# Patient Record
Sex: MALE | Race: WHITE | NOT HISPANIC OR LATINO | Employment: FULL TIME | URBAN - METROPOLITAN AREA
[De-identification: names, ages, dates, MRNs, and addresses within clinical notes are randomized per-mention and may not be internally consistent; named-entity substitution may affect disease eponyms.]

---

## 2024-06-04 DIAGNOSIS — Z12.11 COLON CANCER SCREENING: ICD-10-CM

## 2024-06-04 RX ORDER — POLYETHYLENE GLYCOL 3350, SODIUM CHLORIDE, SODIUM BICARBONATE, POTASSIUM CHLORIDE 420; 11.2; 5.72; 1.48 G/4L; G/4L; G/4L; G/4L
POWDER, FOR SOLUTION ORAL
Qty: 4000 ML | Refills: 0 | Status: SHIPPED | OUTPATIENT
Start: 2024-06-04

## 2024-07-26 ENCOUNTER — APPOINTMENT (OUTPATIENT)
Dept: GASTROENTEROLOGY | Facility: EXTERNAL LOCATION | Age: 59
End: 2024-07-26
Payer: COMMERCIAL

## 2024-07-26 DIAGNOSIS — Z12.11 COLON CANCER SCREENING: Primary | ICD-10-CM

## 2024-07-26 DIAGNOSIS — Z86.010 PERSONAL HISTORY OF COLONIC POLYPS: ICD-10-CM

## 2024-07-26 PROCEDURE — 45378 DIAGNOSTIC COLONOSCOPY: CPT | Performed by: INTERNAL MEDICINE

## 2024-07-26 NOTE — PROGRESS NOTES
Endoscopic procedure was done in [facility] and the procedure report can be found under the media tab.     no

## 2024-12-09 ENCOUNTER — APPOINTMENT (OUTPATIENT)
Dept: OTOLARYNGOLOGY | Facility: CLINIC | Age: 59
End: 2024-12-09
Payer: COMMERCIAL

## 2024-12-09 VITALS — SYSTOLIC BLOOD PRESSURE: 121 MMHG | HEART RATE: 71 BPM | DIASTOLIC BLOOD PRESSURE: 77 MMHG

## 2024-12-09 DIAGNOSIS — H61.23 BILATERAL IMPACTED CERUMEN: Primary | ICD-10-CM

## 2024-12-09 PROCEDURE — 69210 REMOVE IMPACTED EAR WAX UNI: CPT | Performed by: PHYSICIAN ASSISTANT

## 2024-12-09 PROCEDURE — 99203 OFFICE O/P NEW LOW 30 MIN: CPT | Performed by: PHYSICIAN ASSISTANT

## 2024-12-09 PROCEDURE — 1036F TOBACCO NON-USER: CPT | Performed by: PHYSICIAN ASSISTANT

## 2024-12-09 NOTE — PROGRESS NOTES
Angel Luis Antonio is a 59 y.o. year old male patient with Cerumen Impaction (Hearing effected due to wax build up in left ear )     Patient presents to the office today for assessment of his ears.  Patient has a known history of cerumen impaction.  He is here today complaining that the left ear feels full clogged and blocked.  He states that it has been several years since his last visit for an ear cleaning.  He is otherwise well without other ENT related concerns.      Review of Systems   All other systems reviewed and are negative.        Physical Exam:   General appearance: No acute distress. Normal facies. Symmetric facial movement. No gross lesions of the face are noted.  The external ear structures appear normal.  Patient with extensive cerumen impactions bilaterally.  Right ear successfully removed.  Left ear still with residual cerumen at the level of the tympanic membrane but unable to remove completely.  The ear canals patent and the tympanic membranes are intact without evidence of air-fluid levels, retraction, or congenital defects.  Anterior rhinoscopy notes essentially a midline nasal septum. Examination is noted for normal healthy mucosal membranes without any evidence of lesions, polyps, or exudate. The tongue is normally mobile. There are no lesions on the gingiva, buccal, or oral mucosa. There are no oral cavity masses.  The neck is negative for mass lymphadenopathy. The trachea and parotid are clear. The thyroid bed is grossly unremarkable. The salivary gland structures are grossly unremarkable.    Procedure:  Patient with extensive cerumen impactions bilaterally.  Cerumen removed under the otomicroscope from the right ear with the use of suction and curette.  The left ear with severe cerumen impaction.  Cerumen was approximately 70% removed but still some residual cerumen at the level of the tympanic membrane after multiple attempts to remove with the use of suction and  peroxide.    Assessment/Plan   1.  Cerumen impaction    Patient with extensive cerumen impactions bilaterally successfully removed from the right ear but still with residual cerumen left.  Patient will use peroxide twice daily at home until follow-up next Friday.    All questions and concerns were answered and addressed. The patient expresses understanding and will follow up as advised.    Thank you again for allowing us to participate in the care of this patient.

## 2024-12-20 ENCOUNTER — APPOINTMENT (OUTPATIENT)
Dept: OTOLARYNGOLOGY | Facility: CLINIC | Age: 59
End: 2024-12-20
Payer: COMMERCIAL

## 2024-12-20 DIAGNOSIS — H61.22 IMPACTED CERUMEN OF LEFT EAR: Primary | ICD-10-CM

## 2024-12-20 PROCEDURE — 69210 REMOVE IMPACTED EAR WAX UNI: CPT | Performed by: PHYSICIAN ASSISTANT

## 2025-01-29 ENCOUNTER — APPOINTMENT (OUTPATIENT)
Dept: PRIMARY CARE | Facility: CLINIC | Age: 60
End: 2025-01-29
Payer: COMMERCIAL

## 2025-01-29 VITALS
BODY MASS INDEX: 27.11 KG/M2 | DIASTOLIC BLOOD PRESSURE: 72 MMHG | SYSTOLIC BLOOD PRESSURE: 120 MMHG | HEIGHT: 69 IN | WEIGHT: 183 LBS | HEART RATE: 69 BPM

## 2025-01-29 DIAGNOSIS — Z11.59 NEED FOR HEPATITIS C SCREENING TEST: ICD-10-CM

## 2025-01-29 DIAGNOSIS — Z11.4 ENCOUNTER FOR SCREENING FOR HIV: ICD-10-CM

## 2025-01-29 DIAGNOSIS — Z12.5 PROSTATE CANCER SCREENING: ICD-10-CM

## 2025-01-29 DIAGNOSIS — E78.00 ELEVATED LDL CHOLESTEROL LEVEL: ICD-10-CM

## 2025-01-29 DIAGNOSIS — Z00.00 ANNUAL PHYSICAL EXAM: Primary | ICD-10-CM

## 2025-01-29 DIAGNOSIS — R94.31 EKG ABNORMALITY: ICD-10-CM

## 2025-01-29 PROBLEM — R19.8 ABNORMAL FINDINGS ON ESOPHAGOGASTRODUODENOSCOPY (EGD): Status: RESOLVED | Noted: 2025-01-29 | Resolved: 2025-01-29

## 2025-01-29 PROBLEM — R19.8 ABNORMAL FINDINGS ON ESOPHAGOGASTRODUODENOSCOPY (EGD): Status: ACTIVE | Noted: 2025-01-29

## 2025-01-29 LAB
POC APPEARANCE, URINE: CLEAR
POC BILIRUBIN, URINE: NEGATIVE
POC BLOOD, URINE: NEGATIVE
POC COLOR, URINE: YELLOW
POC GLUCOSE, URINE: NEGATIVE MG/DL
POC KETONES, URINE: NEGATIVE MG/DL
POC LEUKOCYTES, URINE: NEGATIVE
POC NITRITE,URINE: NEGATIVE
POC PH, URINE: 6 PH
POC PROTEIN, URINE: NEGATIVE MG/DL
POC SPECIFIC GRAVITY, URINE: 1.02
POC UROBILINOGEN, URINE: 0.2 EU/DL

## 2025-01-29 PROCEDURE — 1036F TOBACCO NON-USER: CPT | Performed by: FAMILY MEDICINE

## 2025-01-29 PROCEDURE — 81002 URINALYSIS NONAUTO W/O SCOPE: CPT | Performed by: FAMILY MEDICINE

## 2025-01-29 PROCEDURE — 93000 ELECTROCARDIOGRAM COMPLETE: CPT | Performed by: FAMILY MEDICINE

## 2025-01-29 PROCEDURE — 3008F BODY MASS INDEX DOCD: CPT | Performed by: FAMILY MEDICINE

## 2025-01-29 PROCEDURE — 99396 PREV VISIT EST AGE 40-64: CPT | Performed by: FAMILY MEDICINE

## 2025-01-29 ASSESSMENT — PATIENT HEALTH QUESTIONNAIRE - PHQ9
SUM OF ALL RESPONSES TO PHQ9 QUESTIONS 1 AND 2: 0
2. FEELING DOWN, DEPRESSED OR HOPELESS: NOT AT ALL
1. LITTLE INTEREST OR PLEASURE IN DOING THINGS: NOT AT ALL

## 2025-01-29 NOTE — ASSESSMENT & PLAN NOTE
Complete history and physical examination was performed  We will notify of test results once available  Paperwork will be filled out for medical necessity to continue working out

## 2025-01-29 NOTE — ASSESSMENT & PLAN NOTE
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

## 2025-01-29 NOTE — PROGRESS NOTES
Subjective   Patient ID: Angel Luis Antonio is a 59 y.o. male who presents for Annual Exam.    Past Medical, Surgical, and Family History reviewed and updated in chart.    Reviewed all medications by prescribing practitioner or clinical pharmacist (such as prescriptions, OTCs, herbal therapies and supplements) and documented in the medical record.    VARGAS Hall is here for his annual physical examination. He underwent a colonoscopy on July 26, 2024, and has been given a 5-year clearance. We discussed the pneumococcal and influenza vaccines, but he is not interested in receiving them today. He is not currently taking any medications.  He is routinely at the dermatology office for skin cancer screenings.    His ASCVD risk is calculated at 5.2%, based on his last lipid panel from October 2022, which showed the following values: total cholesterol 195 mg/dL, LDL 74 mg/dL,  mg/dL, and triglycerides 61 mg/dL.    Review of Systems  All pertinent positive symptoms are included in the history of present illness.    All other systems have been reviewed and are negative and noncontributory to this patient's current ailments.    Past Medical History:   Diagnosis Date    Unspecified convulsions (Multi)     Seizures     Past Surgical History:   Procedure Laterality Date    HAND TENDON SURGERY  02/13/2016    Hand Incision Tendon Sheath Of A Finger     Social History     Tobacco Use    Smoking status: Never    Smokeless tobacco: Never     No family history on file.  Immunization History   Administered Date(s) Administered    COVID-19, mRNA, LNP-S, PF, 30 mcg/0.3 mL dose 03/27/2021, 04/17/2021, 12/13/2021    Flu vaccine (IIV4), preservative free *Check age/dose* 10/06/2022    Tdap vaccine, age 7 year and older (BOOSTRIX, ADACEL) 02/13/2016    Zoster vaccine, recombinant, adult (SHINGRIX) 10/06/2022, 01/16/2023    Zoster, live 10/16/2017     No current outpatient medications    No Known Allergies    Objective   Vitals:    01/29/25  "1329   BP: 120/72   Pulse: 69   Weight: 83 kg (183 lb)   Height: 1.753 m (5' 9\")     Body mass index is 27.02 kg/m².    BP Readings from Last 3 Encounters:   01/29/25 120/72   12/09/24 121/77   10/06/22 122/80      Wt Readings from Last 3 Encounters:   01/29/25 83 kg (183 lb)   10/06/22 79.8 kg (176 lb)   04/19/21 84.8 kg (187 lb)      Physical Exam  CONSTITUTIONAL - well nourished, well developed, looks like stated age, in no acute distress, not ill-appearing, and not tired appearing  SKIN - normal skin color and pigmentation, normal skin turgor without rash, lesions, or nodules visualized  HEAD - no trauma, normocephalic  EYES - pupils are equal and reactive to light, extraocular muscles are intact, and normal external exam  ENT - TM's intact, no injection, no signs of infection, uvula midline, normal tongue movement and throat normal, no exudate  NECK - supple without rigidity, no neck mass was observed, no thyromegaly or thyroid nodules  CHEST - clear to auscultation, no wheezing, no crackles and no rales, good effort  CARDIAC - regular rate and regular rhythm, no skipped beats, no murmur  ABDOMEN - no organomegaly, soft, nontender, nondistended, normal bowel sounds, no guarding/rebound/rigidity, negative McBurney sign and negative Layne sign  EXTREMITIES - no obvious or evident edema, no obvious or evident deformities  NEUROLOGICAL - normal gait, normal balance, normal motor, no ataxia, DTRs equal and symmetrical; alert, oriented and no focal signs  PSYCHIATRIC - alert, pleasant and cordial, age-appropriate  IMMUNOLOGIC - no cervical lymphadenopathy    Assessment/Plan   Problem List Items Addressed This Visit       Elevated LDL cholesterol level     ASCVD risk 5.2%  Please continue exercising with physical activity to ensure risk reduction for heart disease and stroke         Annual physical exam - Primary     Complete history and physical examination was performed  We will notify of test results once " available  Paperwork will be filled out for medical necessity to continue working out         Relevant Orders    Prostate Specific Antigen    Lipid Panel    TSH with reflex to Free T4 if abnormal    Comprehensive Metabolic Panel    CBC and Auto Differential    HIV 1/2 Antigen/Antibody Screen with Reflex to Confirmation    Hepatitis C Antibody    POCT UA (nonautomated) manually resulted (Completed)    ECG 12 Lead (Completed)    EKG abnormality     EKG reveals criteria for LVH with old anterior infarct, not present on EKG in 2022  Will touch base with cardiology to determine if further evaluation and workup warranted          Other Visit Diagnoses       Prostate cancer screening        Relevant Orders    Prostate Specific Antigen    Encounter for screening for HIV        Relevant Orders    HIV 1/2 Antigen/Antibody Screen with Reflex to Confirmation    Need for hepatitis C screening test        Relevant Orders    Hepatitis C Antibody

## 2025-01-29 NOTE — ASSESSMENT & PLAN NOTE
ASCVD risk 5.2%  Please continue exercising with physical activity to ensure risk reduction for heart disease and stroke

## 2025-01-30 DIAGNOSIS — R94.31 EKG ABNORMALITY: Primary | ICD-10-CM

## 2025-02-02 LAB
ALBUMIN SERPL-MCNC: 4.8 G/DL (ref 3.6–5.1)
ALP SERPL-CCNC: 58 U/L (ref 35–144)
ALT SERPL-CCNC: 16 U/L (ref 9–46)
ANION GAP SERPL CALCULATED.4IONS-SCNC: 14 MMOL/L (CALC) (ref 7–17)
AST SERPL-CCNC: 15 U/L (ref 10–35)
BASOPHILS # BLD AUTO: 32 CELLS/UL (ref 0–200)
BASOPHILS NFR BLD AUTO: 0.9 %
BILIRUB SERPL-MCNC: 0.8 MG/DL (ref 0.2–1.2)
BUN SERPL-MCNC: 20 MG/DL (ref 7–25)
CALCIUM SERPL-MCNC: 9.6 MG/DL (ref 8.6–10.3)
CHLORIDE SERPL-SCNC: 101 MMOL/L (ref 98–110)
CHOLEST SERPL-MCNC: 192 MG/DL
CHOLEST/HDLC SERPL: 2.8 (CALC)
CO2 SERPL-SCNC: 24 MMOL/L (ref 20–32)
CREAT SERPL-MCNC: 1.41 MG/DL (ref 0.7–1.3)
EGFRCR SERPLBLD CKD-EPI 2021: 57 ML/MIN/1.73M2
EOSINOPHIL # BLD AUTO: 189 CELLS/UL (ref 15–500)
EOSINOPHIL NFR BLD AUTO: 5.4 %
ERYTHROCYTE [DISTWIDTH] IN BLOOD BY AUTOMATED COUNT: 12.5 % (ref 11–15)
GLUCOSE SERPL-MCNC: 81 MG/DL (ref 65–99)
HCT VFR BLD AUTO: 46.1 % (ref 38.5–50)
HCV AB SERPL QL IA: NORMAL
HDLC SERPL-MCNC: 69 MG/DL
HGB BLD-MCNC: 15 G/DL (ref 13.2–17.1)
HIV 1+2 AB+HIV1 P24 AG SERPL QL IA: NORMAL
LDLC SERPL CALC-MCNC: 109 MG/DL (CALC)
LYMPHOCYTES # BLD AUTO: 1670 CELLS/UL (ref 850–3900)
LYMPHOCYTES NFR BLD AUTO: 47.7 %
MCH RBC QN AUTO: 30.8 PG (ref 27–33)
MCHC RBC AUTO-ENTMCNC: 32.5 G/DL (ref 32–36)
MCV RBC AUTO: 94.7 FL (ref 80–100)
MONOCYTES # BLD AUTO: 301 CELLS/UL (ref 200–950)
MONOCYTES NFR BLD AUTO: 8.6 %
NEUTROPHILS # BLD AUTO: 1309 CELLS/UL (ref 1500–7800)
NEUTROPHILS NFR BLD AUTO: 37.4 %
NONHDLC SERPL-MCNC: 123 MG/DL (CALC)
PLATELET # BLD AUTO: 271 THOUSAND/UL (ref 140–400)
PMV BLD REES-ECKER: 10.2 FL (ref 7.5–12.5)
POTASSIUM SERPL-SCNC: 4.4 MMOL/L (ref 3.5–5.3)
PROT SERPL-MCNC: 6.8 G/DL (ref 6.1–8.1)
PSA SERPL-MCNC: 1.17 NG/ML
RBC # BLD AUTO: 4.87 MILLION/UL (ref 4.2–5.8)
SODIUM SERPL-SCNC: 139 MMOL/L (ref 135–146)
TRIGL SERPL-MCNC: 58 MG/DL
TSH SERPL-ACNC: 1.39 MIU/L (ref 0.4–4.5)
WBC # BLD AUTO: 3.5 THOUSAND/UL (ref 3.8–10.8)

## 2025-02-03 DIAGNOSIS — D72.819 LEUKOPENIA, UNSPECIFIED TYPE: Primary | ICD-10-CM

## 2025-02-03 DIAGNOSIS — R94.4 DECREASED GFR: ICD-10-CM

## 2025-02-19 ENCOUNTER — OFFICE VISIT (OUTPATIENT)
Dept: CARDIOLOGY | Facility: CLINIC | Age: 60
End: 2025-02-19
Payer: COMMERCIAL

## 2025-02-19 VITALS
DIASTOLIC BLOOD PRESSURE: 84 MMHG | TEMPERATURE: 98.3 F | SYSTOLIC BLOOD PRESSURE: 129 MMHG | HEART RATE: 74 BPM | HEIGHT: 69 IN | WEIGHT: 181 LBS | BODY MASS INDEX: 26.81 KG/M2

## 2025-02-19 DIAGNOSIS — R94.31 EKG ABNORMALITY: Primary | ICD-10-CM

## 2025-02-19 PROCEDURE — 99213 OFFICE O/P EST LOW 20 MIN: CPT | Performed by: INTERNAL MEDICINE

## 2025-02-19 PROCEDURE — 99203 OFFICE O/P NEW LOW 30 MIN: CPT | Performed by: INTERNAL MEDICINE

## 2025-02-19 PROCEDURE — 1036F TOBACCO NON-USER: CPT | Performed by: INTERNAL MEDICINE

## 2025-02-19 PROCEDURE — 3008F BODY MASS INDEX DOCD: CPT | Performed by: INTERNAL MEDICINE

## 2025-02-19 NOTE — PROGRESS NOTES
"Chief Complaint:   Abnormal ECG     History of Present Illness     Angel Luis Antonio is a 59 y.o. male presenting with LVH on a routine EKG.  No Hx HTN, DM, HPL, cardiac Hx.  No BINH.  Exercises daily.  Denies CP or HDZ.  No palpitations or syncope.  Fhx - Mother and sister had valve replacement. Non-smoker.        Previous History     Past Medical History:  He has a past medical history of Unspecified convulsions (Multi).    Past Surgical History:  He has a past surgical history that includes Hand tendon surgery (02/13/2016).      Social History:  He reports that he has never smoked. He has never used smokeless tobacco. No history on file for alcohol use and drug use.    Family History:  No family history on file.     Allergies:  Patient has no known allergies.    Outpatient Medications:  No current outpatient medications    Physical Examination   Vitals:  Visit Vitals  /84 (BP Location: Right arm)   Pulse 74   Temp 36.8 °C (98.3 °F)   Ht 1.753 m (5' 9\")   Wt 82.1 kg (181 lb)   BMI 26.73 kg/m²   Smoking Status Never   BSA 2 m²      Physical Exam  Vitals reviewed.   Constitutional:       General: He is not in acute distress.     Appearance: Normal appearance.   HENT:      Head: Normocephalic and atraumatic.      Nose: Nose normal.   Eyes:      Conjunctiva/sclera: Conjunctivae normal.   Cardiovascular:      Rate and Rhythm: Normal rate and regular rhythm.      Pulses: Normal pulses.      Heart sounds: No murmur heard.  Pulmonary:      Effort: Pulmonary effort is normal. No respiratory distress.      Breath sounds: Normal breath sounds. No wheezing, rhonchi or rales.   Abdominal:      General: Bowel sounds are normal. There is no distension.      Palpations: Abdomen is soft.      Tenderness: There is no abdominal tenderness.   Musculoskeletal:         General: No swelling.      Right lower leg: No edema.      Left lower leg: No edema.   Skin:     General: Skin is warm and dry.      Capillary Refill: Capillary " refill takes less than 2 seconds.   Neurological:      General: No focal deficit present.      Mental Status: He is alert.   Psychiatric:         Mood and Affect: Mood normal.           Labs/Imaging/Cardiac Studies   I have personally reviewed the patient's available lab work, primary care appointment notes, pertinent imaging studies, and cardiac studies and have discussed them and my independent interpretation of those results with the patient and caregiver at this appointment.  All pertinent recent Emergency Department evaluations and Hospital admissions were also reviewed in detail with the patient and caregiver.    I reviewed ECG and Labs    Echo:  No echocardiogram results found for the past 12 months       Assessment and Recommendations     Assessment/Plan       1. EKG abnormality (Primary)  LVH on ECG.  No reason to have LVH.  Suggest Echo to further evaluate.  ASX.             Lauro Sharpe MD    Exclusive of any other services or procedures performed, I, Lauro Sharpe MD , spent 30 minutes in duration for this visit today.  This time consisted of chart review, obtaining history, and/or performing the exam as documented above as well as documenting the clinical information for the encounter in the electronic record, discussing treatment options, plans, and/or goals with patient, family, and/or caregiver, refilling medications, updating the electronic record, ordering medicines, lab work, imaging, referrals, and/or procedures as documented above and communicating with other [facility] professionals. I have discussed the results of laboratory, radiology, and cardiology studies with the patient and their family/caregiver.

## 2025-03-02 LAB
ALBUMIN SERPL-MCNC: 4.6 G/DL (ref 3.6–5.1)
BASOPHILS # BLD AUTO: 31 CELLS/UL (ref 0–200)
BASOPHILS NFR BLD AUTO: 0.9 %
BUN SERPL-MCNC: 16 MG/DL (ref 7–25)
BUN/CREAT SERPL: 12 (CALC) (ref 6–22)
CALCIUM SERPL-MCNC: 9.7 MG/DL (ref 8.6–10.3)
CHLORIDE SERPL-SCNC: 100 MMOL/L (ref 98–110)
CO2 SERPL-SCNC: 25 MMOL/L (ref 20–32)
CREAT SERPL-MCNC: 1.37 MG/DL (ref 0.7–1.35)
EGFRCR SERPLBLD CKD-EPI 2021: 59 ML/MIN/1.73M2
EOSINOPHIL # BLD AUTO: 150 CELLS/UL (ref 15–500)
EOSINOPHIL NFR BLD AUTO: 4.4 %
ERYTHROCYTE [DISTWIDTH] IN BLOOD BY AUTOMATED COUNT: 12.6 % (ref 11–15)
GLUCOSE SERPL-MCNC: 100 MG/DL (ref 65–99)
HCT VFR BLD AUTO: 45.9 % (ref 38.5–50)
HGB BLD-MCNC: 15 G/DL (ref 13.2–17.1)
LYMPHOCYTES # BLD AUTO: 1533 CELLS/UL (ref 850–3900)
LYMPHOCYTES NFR BLD AUTO: 45.1 %
MCH RBC QN AUTO: 31.5 PG (ref 27–33)
MCHC RBC AUTO-ENTMCNC: 32.7 G/DL (ref 32–36)
MCV RBC AUTO: 96.4 FL (ref 80–100)
MONOCYTES # BLD AUTO: 292 CELLS/UL (ref 200–950)
MONOCYTES NFR BLD AUTO: 8.6 %
NEUTROPHILS # BLD AUTO: 1394 CELLS/UL (ref 1500–7800)
NEUTROPHILS NFR BLD AUTO: 41 %
PHOSPHATE SERPL-MCNC: 2.8 MG/DL (ref 2.5–4.5)
PLATELET # BLD AUTO: 236 THOUSAND/UL (ref 140–400)
PMV BLD REES-ECKER: 10.5 FL (ref 7.5–12.5)
POTASSIUM SERPL-SCNC: 4.4 MMOL/L (ref 3.5–5.3)
RBC # BLD AUTO: 4.76 MILLION/UL (ref 4.2–5.8)
SODIUM SERPL-SCNC: 138 MMOL/L (ref 135–146)
WBC # BLD AUTO: 3.4 THOUSAND/UL (ref 3.8–10.8)

## 2025-03-03 DIAGNOSIS — R94.4 DECREASED GFR: ICD-10-CM

## 2025-03-03 DIAGNOSIS — D72.819 LEUKOPENIA, UNSPECIFIED TYPE: ICD-10-CM

## 2025-03-04 NOTE — RESULT ENCOUNTER NOTE
Your white blood cell count remains slightly low at 3.4, similar to the previous result of 3.5, with absolute neutrophils stable at 1,394 compared to 1,309 before. While these values are stable, the low white blood cell count suggests that your immune system might not be functioning as robustly as it should. Your kidney function, with a GFR of 59, previously 57, also remains stable but indicates chronic kidney disease because the GFR is below 60. This is unusual since you are not taking any medication that might affect kidney function.    To address these concerns, I recommend scheduling a follow-up appointment with me in about 3 months to continue monitoring your condition. If there is any further decline in your kidney function or white blood cell count, we should consider consulting with a hematologist to understand the immune system issues and a nephrologist for your kidney health.     Additionally, if the blood work continues to indicate chronic kidney disease, a kidney ultrasound might be warranted to investigate further.

## 2025-03-11 ENCOUNTER — APPOINTMENT (OUTPATIENT)
Dept: CARDIOLOGY | Facility: CLINIC | Age: 60
End: 2025-03-11
Payer: COMMERCIAL

## 2025-03-12 ENCOUNTER — HOSPITAL ENCOUNTER (OUTPATIENT)
Dept: CARDIOLOGY | Facility: CLINIC | Age: 60
Discharge: HOME | End: 2025-03-12
Payer: COMMERCIAL

## 2025-03-12 DIAGNOSIS — R94.31 EKG ABNORMALITY: ICD-10-CM

## 2025-03-12 PROCEDURE — 93306 TTE W/DOPPLER COMPLETE: CPT | Performed by: INTERNAL MEDICINE

## 2025-03-12 PROCEDURE — 93306 TTE W/DOPPLER COMPLETE: CPT

## 2025-03-13 LAB
AORTIC VALVE PEAK VELOCITY: 1.54 M/S
AV PEAK GRADIENT: 9 MMHG
AVA (PEAK VEL): 4.09 CM2
EJECTION FRACTION APICAL 4 CHAMBER: 75.7
EJECTION FRACTION: 63 %
LEFT ATRIUM VOLUME AREA LENGTH INDEX BSA: 30.3 ML/M2
LEFT VENTRICLE INTERNAL DIMENSION DIASTOLE: 3.71 CM (ref 3.5–6)
LEFT VENTRICULAR OUTFLOW TRACT DIAMETER: 2.42 CM
MITRAL VALVE E/A RATIO: 1.11
RIGHT VENTRICLE FREE WALL PEAK S': 9 CM/S
TRICUSPID ANNULAR PLANE SYSTOLIC EXCURSION: 1.8 CM

## 2025-06-03 ENCOUNTER — APPOINTMENT (OUTPATIENT)
Dept: PRIMARY CARE | Facility: CLINIC | Age: 60
End: 2025-06-03
Payer: COMMERCIAL

## 2025-06-03 VITALS
DIASTOLIC BLOOD PRESSURE: 80 MMHG | SYSTOLIC BLOOD PRESSURE: 122 MMHG | BODY MASS INDEX: 24.59 KG/M2 | HEART RATE: 68 BPM | WEIGHT: 166 LBS | HEIGHT: 69 IN

## 2025-06-03 DIAGNOSIS — N18.31 STAGE 3A CHRONIC KIDNEY DISEASE (MULTI): ICD-10-CM

## 2025-06-03 DIAGNOSIS — D72.819 LEUKOPENIA, UNSPECIFIED TYPE: Primary | ICD-10-CM

## 2025-06-03 PROCEDURE — 3008F BODY MASS INDEX DOCD: CPT | Performed by: FAMILY MEDICINE

## 2025-06-03 PROCEDURE — 99213 OFFICE O/P EST LOW 20 MIN: CPT | Performed by: FAMILY MEDICINE

## 2025-06-03 PROCEDURE — 1036F TOBACCO NON-USER: CPT | Performed by: FAMILY MEDICINE

## 2025-06-03 ASSESSMENT — PATIENT HEALTH QUESTIONNAIRE - PHQ9
SUM OF ALL RESPONSES TO PHQ9 QUESTIONS 1 AND 2: 0
1. LITTLE INTEREST OR PLEASURE IN DOING THINGS: NOT AT ALL
2. FEELING DOWN, DEPRESSED OR HOPELESS: NOT AT ALL

## 2025-06-03 NOTE — ASSESSMENT & PLAN NOTE
Please repeat blood work and if it continues to show a drop in WBC along with concerns about differential, hematology consult would be warranted

## 2025-06-03 NOTE — ASSESSMENT & PLAN NOTE
If GFR continues to drop, we may want to consider a kidney ultrasound  We will notify of test results once available

## 2025-06-03 NOTE — PROGRESS NOTES
Chief Complaint  Patient ID: Angel Luis Antonio is a 60 y.o. male who presents for Leukopenia and Chronic Kidney Disease.    Past Medical, Surgical, and Family History reviewed and updated in chart.    Reviewed all medications by prescribing practitioner or clinical pharmacist (such as prescriptions, OTCs, herbal therapies and supplements) and documented in the medical record.    History of Present Illness  1. Leukopenia     - At his visit 3 months ago, WBC was low at 3.4, similar to the previous result of 3.5.     - Absolute neutrophils were stable at 1,394 compared to 1,309 before.     - Suggested a follow-up in about 3 months to ensure stability.  No known source of infection per patient report    2. Chronic Kidney Disease (CKD)     - GFR is 59, previously 57, with no medication affecting kidney function.     - No clear reason for low GFR at age 60.     - Recommended repeating the test; if it remains low, consider a kidney ultrasound.    Review of Systems  All pertinent positive symptoms are included in the history of present illness.    All other systems have been reviewed and are negative and noncontributory to this patient's current ailments.    Past Medical History  He has a past medical history of Unspecified convulsions (Multi).    Surgical History  He has a past surgical history that includes Hand tendon surgery (02/13/2016).     Social History  He reports that he has never smoked. He has never used smokeless tobacco. No history on file for alcohol use and drug use.    Family History[1]  Medications Prior to Visit[2]    Allergies  Patient has no known allergies.    Immunization History   Administered Date(s) Administered    COVID-19, mRNA, LNP-S, PF, 30 mcg/0.3 mL dose 03/27/2021, 04/17/2021, 12/13/2021    Flu vaccine (IIV4), preservative free *Check age/dose* 10/06/2022    Tdap vaccine, age 7 year and older (BOOSTRIX, ADACEL) 02/13/2016    Zoster vaccine, recombinant, adult (SHINGRIX) 10/06/2022,  "01/16/2023    Zoster, live 10/16/2017     Objective   Visit Vitals  /80   Pulse 68   Ht 1.753 m (5' 9\")   Wt 75.3 kg (166 lb)   BMI 24.51 kg/m²   Smoking Status Never   BSA 1.91 m²        BP Readings from Last 3 Encounters:   06/03/25 122/80   02/19/25 129/84   01/29/25 120/72      Wt Readings from Last 3 Encounters:   06/03/25 75.3 kg (166 lb)   02/19/25 82.1 kg (181 lb)   01/29/25 83 kg (183 lb)      Relevant Results  No visits with results within 1 Month(s) from this visit.   Latest known visit with results is:   Hospital Outpatient Visit on 03/12/2025   Component Date Value    AV pk lauri 03/12/2025 1.54     LVOT diam 03/12/2025 2.42     MV E/A ratio 03/12/2025 1.11     LA vol index A/L 03/12/2025 30.3     Tricuspid annular plane * 03/12/2025 1.8     LV EF 03/12/2025 63     RV free wall pk S' 03/12/2025 9.00     LVIDd 03/12/2025 3.71     Aortic Valve Area by Con* 03/12/2025 4.09     AV pk grad 03/12/2025 9     LV A4C EF 03/12/2025 75.7      The 10-year ASCVD risk score (Abida DK, et al., 2019) is: 6.1%    Values used to calculate the score:      Age: 60 years      Sex: Male      Is Non- : No      Diabetic: No      Tobacco smoker: No      Systolic Blood Pressure: 122 mmHg      Is BP treated: No      HDL Cholesterol: 69 mg/dL      Total Cholesterol: 192 mg/dL    Physical Exam  CONSTITUTIONAL - well nourished, well developed, looks like stated age, in no acute distress, not ill-appearing, and not tired appearing  SKIN - normal skin color and pigmentation, normal skin turgor without rash, lesions, or nodules visualized  HEAD - no trauma, normocephalic  EYES - pupils are equal and reactive to light, extraocular muscles are intact, and normal external exam  NECK - supple without rigidity, no neck mass was observed, no thyromegaly or thyroid nodules  CHEST - clear to auscultation, no wheezing, no crackles and no rales, good effort  CARDIAC - regular rate and regular rhythm, no skipped " beats, no murmur  EXTREMITIES - no obvious or evident edema, no obvious or evident deformities  NEUROLOGICAL - normal gait, normal balance, normal motor, no ataxia, alert, oriented and no focal signs  PSYCHIATRIC - alert, pleasant and cordial, age-appropriate  IMMUNOLOGIC - no cervical lymphadenopathy    Assessment and Plan  Problem List Items Addressed This Visit       Leukopenia - Primary    Please repeat blood work and if it continues to show a drop in WBC along with concerns about differential, hematology consult would be warranted         Relevant Orders    CBC and Auto Differential    Stage 3a chronic kidney disease (Multi)    If GFR continues to drop, we may want to consider a kidney ultrasound  We will notify of test results once available         Relevant Orders    Comprehensive Metabolic Panel          [1] No family history on file.  [2]   No outpatient medications prior to visit.     No facility-administered medications prior to visit.

## 2025-06-04 PROBLEM — N18.31 STAGE 3A CHRONIC KIDNEY DISEASE (MULTI): Status: RESOLVED | Noted: 2025-06-03 | Resolved: 2025-06-04

## 2025-06-04 PROBLEM — D72.819 LEUKOPENIA: Status: RESOLVED | Noted: 2025-06-03 | Resolved: 2025-06-04

## 2025-06-04 LAB
ALBUMIN SERPL-MCNC: 4.5 G/DL (ref 3.6–5.1)
ALP SERPL-CCNC: 52 U/L (ref 35–144)
ALT SERPL-CCNC: 14 U/L (ref 9–46)
ANION GAP SERPL CALCULATED.4IONS-SCNC: 8 MMOL/L (CALC) (ref 7–17)
AST SERPL-CCNC: 16 U/L (ref 10–35)
BASOPHILS # BLD AUTO: 38 CELLS/UL (ref 0–200)
BASOPHILS NFR BLD AUTO: 0.5 %
BILIRUB SERPL-MCNC: 0.8 MG/DL (ref 0.2–1.2)
BUN SERPL-MCNC: 21 MG/DL (ref 7–25)
CALCIUM SERPL-MCNC: 9.4 MG/DL (ref 8.6–10.3)
CHLORIDE SERPL-SCNC: 104 MMOL/L (ref 98–110)
CO2 SERPL-SCNC: 26 MMOL/L (ref 20–32)
CREAT SERPL-MCNC: 1.14 MG/DL (ref 0.7–1.35)
EGFRCR SERPLBLD CKD-EPI 2021: 74 ML/MIN/1.73M2
EOSINOPHIL # BLD AUTO: 143 CELLS/UL (ref 15–500)
EOSINOPHIL NFR BLD AUTO: 1.9 %
ERYTHROCYTE [DISTWIDTH] IN BLOOD BY AUTOMATED COUNT: 13.6 % (ref 11–15)
GLUCOSE SERPL-MCNC: 81 MG/DL (ref 65–99)
HCT VFR BLD AUTO: 46.2 % (ref 38.5–50)
HGB BLD-MCNC: 14.8 G/DL (ref 13.2–17.1)
LYMPHOCYTES # BLD AUTO: 1650 CELLS/UL (ref 850–3900)
LYMPHOCYTES NFR BLD AUTO: 22 %
MCH RBC QN AUTO: 31.6 PG (ref 27–33)
MCHC RBC AUTO-ENTMCNC: 32 G/DL (ref 32–36)
MCV RBC AUTO: 98.7 FL (ref 80–100)
MONOCYTES # BLD AUTO: 450 CELLS/UL (ref 200–950)
MONOCYTES NFR BLD AUTO: 6 %
NEUTROPHILS # BLD AUTO: 5220 CELLS/UL (ref 1500–7800)
NEUTROPHILS NFR BLD AUTO: 69.6 %
PLATELET # BLD AUTO: 219 THOUSAND/UL (ref 140–400)
PMV BLD REES-ECKER: 10.5 FL (ref 7.5–12.5)
POTASSIUM SERPL-SCNC: 4.2 MMOL/L (ref 3.5–5.3)
PROT SERPL-MCNC: 6.5 G/DL (ref 6.1–8.1)
RBC # BLD AUTO: 4.68 MILLION/UL (ref 4.2–5.8)
SODIUM SERPL-SCNC: 138 MMOL/L (ref 135–146)
WBC # BLD AUTO: 7.5 THOUSAND/UL (ref 3.8–10.8)

## 2025-06-04 NOTE — RESULT ENCOUNTER NOTE
White blood cell count has reverted to normal, and your kidney function is excellent  Thank you for getting this repeated, no concerns whatsoever, so I am going to erase the chronic kidney disease diagnosis from your chart

## 2025-06-06 ENCOUNTER — APPOINTMENT (OUTPATIENT)
Dept: PRIMARY CARE | Facility: CLINIC | Age: 60
End: 2025-06-06
Payer: COMMERCIAL